# Patient Record
Sex: FEMALE | Race: WHITE | NOT HISPANIC OR LATINO | URBAN - NONMETROPOLITAN AREA
[De-identification: names, ages, dates, MRNs, and addresses within clinical notes are randomized per-mention and may not be internally consistent; named-entity substitution may affect disease eponyms.]

---

## 2019-08-20 ENCOUNTER — IMPORTED ENCOUNTER (OUTPATIENT)
Dept: URBAN - NONMETROPOLITAN AREA CLINIC 1 | Facility: CLINIC | Age: 74
End: 2019-08-20

## 2019-08-20 PROBLEM — H52.01: Noted: 2019-08-20

## 2019-08-20 PROBLEM — H52.4: Noted: 2019-08-20

## 2019-08-20 PROBLEM — H16.223: Noted: 2020-08-19

## 2019-08-20 PROBLEM — H52.221: Noted: 2019-08-20

## 2019-08-20 PROCEDURE — 92004 COMPRE OPH EXAM NEW PT 1/>: CPT

## 2019-08-20 PROCEDURE — 92015 DETERMINE REFRACTIVE STATE: CPT

## 2019-08-20 NOTE — PATIENT DISCUSSION
Cataracts OU-  discussed findings w/patient-  no treatment indicated at this time-  UV protection recommended-  continue to monitor yearly or prnDES OU-  discussed findings w/patient-  start Systane Complete at least BID OU -  continue to monitor yearly or prnCompound Hyperopic Astigmatism OU w/Presbyopia-  discussed findings w/patient-  new spectacle Rx issued-  continue to monitor yearly or prn; 's Notes: MR 8/20/2019DFE 8/20/2019

## 2020-08-19 ENCOUNTER — IMPORTED ENCOUNTER (OUTPATIENT)
Dept: URBAN - NONMETROPOLITAN AREA CLINIC 1 | Facility: CLINIC | Age: 75
End: 2020-08-19

## 2020-08-19 PROCEDURE — 92015 DETERMINE REFRACTIVE STATE: CPT

## 2020-08-19 PROCEDURE — 92014 COMPRE OPH EXAM EST PT 1/>: CPT

## 2020-08-19 NOTE — PATIENT DISCUSSION
Cataracts OU-  discussed findings w/patient-  slightly worse OD today no changes OS-  patient defers cat eval at this time-  UV protection recommended-  continue to monitor yearly or prnDES OU-  discussed findings w/patient-  worsening noted at this time-  patient has not been using drops-  start Refresh Reliva at least BID OU samples given today -  continue to monitor yearly or prnCompound Hyperopic Astigmatism OU w/Presbyopia-  discussed findings w/patient-  new spectacle Rx issued-  continue to monitor yearly or prn; 's Notes: MR 8/19/2020DFE 8/19/2020

## 2021-08-18 ENCOUNTER — IMPORTED ENCOUNTER (OUTPATIENT)
Dept: URBAN - NONMETROPOLITAN AREA CLINIC 1 | Facility: CLINIC | Age: 76
End: 2021-08-18

## 2021-08-18 PROCEDURE — 92014 COMPRE OPH EXAM EST PT 1/>: CPT

## 2021-08-18 PROCEDURE — 92015 DETERMINE REFRACTIVE STATE: CPT

## 2021-08-18 NOTE — PATIENT DISCUSSION
Cataracts OU-  discussed findings w/patient-  mild changes noted OU-  patient defers cat eval at this time-  UV protection recommended-  continue to monitor yearly or prnDES OU-  discussed findings w/patient-  stable findings noted at this time-  continue AT's at least BID OU-  continue to monitor yearly or prnCompound Hyperopic Astigmatism OU w/Presbyopia-  discussed findings w/patient-  new spectacle Rx issued-  continue to monitor yearly or prn; 's Notes: MR 8/18/2021DFE 8/18/2021

## 2022-04-10 ASSESSMENT — TONOMETRY
OD_IOP_MMHG: 15
OS_IOP_MMHG: 15
OS_IOP_MMHG: 14
OS_IOP_MMHG: 14
OD_IOP_MMHG: 14
OD_IOP_MMHG: 16

## 2022-04-10 ASSESSMENT — VISUAL ACUITY
OS_GLARE: 20/50
OS_CC: 20/20
OD_CC: 20/30
OU_CC: 20/20
OD_CC: 20/40
OU_SC: 20/40
OS_GLARE: 20/50
OU_CC: J1+
OS_CC: 20/20-
OD_GLARE: 20/70
OD_GLARE: 20/70
OD_CC: 20/40+1
OS_CC: 20/25-2